# Patient Record
Sex: FEMALE | Race: BLACK OR AFRICAN AMERICAN | NOT HISPANIC OR LATINO | URBAN - METROPOLITAN AREA
[De-identification: names, ages, dates, MRNs, and addresses within clinical notes are randomized per-mention and may not be internally consistent; named-entity substitution may affect disease eponyms.]

---

## 2019-11-04 ENCOUNTER — EMERGENCY (EMERGENCY)
Facility: HOSPITAL | Age: 19
LOS: 1 days | Discharge: HOME | End: 2019-11-04
Attending: EMERGENCY MEDICINE | Admitting: EMERGENCY MEDICINE
Payer: OTHER MISCELLANEOUS

## 2019-11-04 PROCEDURE — 99282 EMERGENCY DEPT VISIT SF MDM: CPT

## 2019-11-04 PROCEDURE — 99053 MED SERV 10PM-8AM 24 HR FAC: CPT
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
## 2019-11-05 VITALS
SYSTOLIC BLOOD PRESSURE: 126 MMHG | TEMPERATURE: 99 F | OXYGEN SATURATION: 100 % | RESPIRATION RATE: 14 BRPM | DIASTOLIC BLOOD PRESSURE: 92 MMHG | HEART RATE: 87 BPM

## 2019-11-05 RX ORDER — IBUPROFEN 200 MG
600 TABLET ORAL ONCE
Refills: 0 | Status: COMPLETED | OUTPATIENT
Start: 2019-11-05 | End: 2019-11-05

## 2019-11-05 RX ADMIN — Medication 600 MILLIGRAM(S): at 01:33
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
## 2019-11-05 NOTE — ED PROVIDER NOTE - NSFOLLOWUPINSTRUCTIONS_ED_ALL_ED_FT
Drink plenty of fluids.  You can take ibuprofen 600mg every 6 hours or Tylenol 650mg every 4 hours as needed for pain or fever.  Apply warm compresses as needed.  Follow-up with your PMD in 1 week.  Return to the emergency department for any new or worsening symptoms.

## 2019-11-05 NOTE — ED PROVIDER NOTE - ENMT, MLM
Airway patent, Nasal mucosa clear. Mouth with normal mucosa. Throat has no vesicles, no oropharyngeal exudates and uvula is midline. No hemotympanum.  No facial bruising or deformity.  Orbital rim intact no tenderness EOMI without pain or proptosis.  No malocclusion or loose teeth.

## 2019-11-05 NOTE — ED PROVIDER NOTE - MUSCULOSKELETAL, MLM
Spine appears normal, range of motion is not limited, no muscle or joint tenderness, no midline spinal tenderness or step off.
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
## 2019-11-05 NOTE — ED PROVIDER NOTE - PATIENT PORTAL LINK FT
You can access the FollowMyHealth Patient Portal offered by [facility] by registering at the following website: [email or web address]. By joining [employer]’s FollowMyHealth portal, you will also be able to view your health information using other applications (apps) compatible with our system.

## 2020-04-26 ENCOUNTER — MESSAGE (OUTPATIENT)
Age: 20
End: 2020-04-26

## 2022-10-11 PROBLEM — Z00.00 ENCOUNTER FOR PREVENTIVE HEALTH EXAMINATION: Status: ACTIVE | Noted: 2022-10-11

## 2024-01-08 ENCOUNTER — APPOINTMENT (OUTPATIENT)
Facility: HOSPITAL | Age: 24
End: 2024-01-08
Payer: COMMERCIAL

## 2024-01-08 ENCOUNTER — HOSPITAL ENCOUNTER (EMERGENCY)
Facility: HOSPITAL | Age: 24
Discharge: HOME | End: 2024-01-08
Attending: STUDENT IN AN ORGANIZED HEALTH CARE EDUCATION/TRAINING PROGRAM
Payer: COMMERCIAL

## 2024-01-08 VITALS
BODY MASS INDEX: 22.2 KG/M2 | SYSTOLIC BLOOD PRESSURE: 126 MMHG | DIASTOLIC BLOOD PRESSURE: 79 MMHG | HEART RATE: 97 BPM | WEIGHT: 130 LBS | RESPIRATION RATE: 17 BRPM | TEMPERATURE: 98.3 F | OXYGEN SATURATION: 100 % | HEIGHT: 64 IN

## 2024-01-08 DIAGNOSIS — O20.0 THREATENED MISCARRIAGE: Primary | ICD-10-CM

## 2024-01-08 LAB
ABO + RH BLD: NORMAL
HCG SERPL-ACNC: ABNORMAL MIU/ML (ref 0–6)

## 2024-01-08 PROCEDURE — 84702 CHORIONIC GONADOTROPIN TEST: CPT

## 2024-01-08 PROCEDURE — 86900 BLOOD TYPING SEROLOGIC ABO: CPT

## 2024-01-08 PROCEDURE — 76801 OB US < 14 WKS SINGLE FETUS: CPT

## 2024-01-08 PROCEDURE — 36415 COLL VENOUS BLD VENIPUNCTURE: CPT

## 2024-01-08 PROCEDURE — 99284 EMERGENCY DEPT VISIT MOD MDM: CPT

## 2024-01-08 PROCEDURE — 86901 BLOOD TYPING SEROLOGIC RH(D): CPT

## 2024-01-08 PROCEDURE — 76817 TRANSVAGINAL US OBSTETRIC: CPT

## 2024-01-08 ASSESSMENT — LIFESTYLE VARIABLES
HOW MANY STANDARD DRINKS CONTAINING ALCOHOL DO YOU HAVE ON A TYPICAL DAY: 1 OR 2
HOW OFTEN DO YOU HAVE A DRINK CONTAINING ALCOHOL: MONTHLY OR LESS

## 2024-01-08 NOTE — ED PROVIDER NOTES
EMERGENCY DEPARTMENT HISTORY AND PHYSICAL EXAM      Date: 2024  Patient Name: Luz Omalley  MRN: [id number]  YOB: 2000  Date of evaluation: 2024  Provider: Gabriel Bender MD     History of Present Illness     Chief Complaint   Patient presents with    Vaginal Bleeding    Pregnancy Problem    Abdominal Cramping       History Provided By: Patient    HPI: Luz Omalley, 23 y.o. female with past medical history as listed and reviewed below presenting to the ED for evaluation of vaginal bleeding with abdominal cramping, G3, .  The patient states she is approximately 6 weeks 3 days pregnant, she found out she was pregnant in urgent care visit for an STI check last week.  She notes today she was sitting on the floor watching TV when she developed abdominal cramping and vaginal bleeding.  She reports her cramping has improved, she has not not yet had an ultrasound to confirm location of pregnancy.  She denies any fever or chills    Medical History     Past Medical History:  History reviewed. No pertinent past medical history.    Past Surgical History:  History reviewed. No pertinent surgical history.    Family History:  History reviewed. No pertinent family history.    Social History:  Social History     Tobacco Use    Smoking status: Never    Smokeless tobacco: Never   Substance Use Topics    Alcohol use: Not Currently    Drug use: Never       Allergies:  No Known Allergies    PCP: No primary care provider on file.    Current Medications:   There are no discharge medications for this patient.      Review of Systems     Positives and Pertinent negatives as per HPI.    Physical Exam     Vitals:  I reviewed the patient's vital signs  Vitals:    24 0016 24 0025   BP: 126/79    Pulse: 97    Resp: 17    Temp: 98.3 °F (36.8 °C)    TempSrc: Oral    SpO2: 100%    Weight:  59 kg (130 lb)   Height:  1.626 m (5' 4\")       Physical Exam  Vitals and nursing note reviewed.   HENT:      Nose: Nose normal.

## 2024-01-08 NOTE — ED TRIAGE NOTES
Patient presents with vaginal bleeding and abc cramping. Approx 6 weeks pregnant. Last cycle began 11/24/2023. States bright red bleeding, saturating pad.

## 2024-01-08 NOTE — DISCHARGE INSTRUCTIONS
Thank you!    Thank you for allowing me to care for you in the emergency department.  I sincerely hope that you are satisfied with your visit today.  It is my goal to provide you with excellent care.    Below you will find a list of your labs and imaging from your visit today if applicable. Should you have any questions regarding these results please do not hesitate to call the emergency department. Please review [employer] for a more detailed result list since the below list may not be comprehensive. Instructions on how to sign up to [employer] should be provided in this packet.    Labs -     Recent Results (from the past 12 hour(s))   HCG, Quantitative, Pregnancy    Collection Time: 24 12:30 AM   Result Value Ref Range    hCG Quant 17,233 (H) 0 - 6 mIU/mL   ABO/RH    Collection Time: 24 12:30 AM   Result Value Ref Range    ABO/Rh O Positive        Radiologic Studies -   US OB TRANSVAGINAL   Final Result      1.  Intrauterine products of conception are present with a gestational sac and   fetal pole. No heart rate is identified this time. There is heterogeneous   echodensity within the endometrial canal suggestive of hemorrhage. Findings may   represent an incomplete . Recommend obstetric follow-up.            US OB LESS THAN 14 WEEKS SINGLE OR FIRST GESTATION    (Results Pending)     [unfilled]  [unfilled]       If you feel that you have not received excellent quality care or timely care, please ask to speak to the nurse manager. Please choose us in the future for your continued health care needs.   ------------------------------------------------------------------------------------------------------------  The exam and treatment you received in the Emergency Department were for an urgent problem and are not intended as complete care. It is very important that you follow-up with a doctor, nurse practitioner, or physician assistant in a timely manner to:  (1) confirm your diagnosis and review all imaging

## 2024-02-09 ENCOUNTER — OFFICE VISIT (OUTPATIENT)
Age: 24
End: 2024-02-09
Payer: COMMERCIAL

## 2024-02-09 VITALS
BODY MASS INDEX: 22.76 KG/M2 | OXYGEN SATURATION: 99 % | DIASTOLIC BLOOD PRESSURE: 63 MMHG | RESPIRATION RATE: 16 BRPM | WEIGHT: 133.31 LBS | HEART RATE: 110 BPM | SYSTOLIC BLOOD PRESSURE: 110 MMHG | HEIGHT: 64 IN | TEMPERATURE: 97.6 F

## 2024-02-09 DIAGNOSIS — Z12.4 SCREENING FOR MALIGNANT NEOPLASM OF CERVIX: ICD-10-CM

## 2024-02-09 DIAGNOSIS — Z87.42 HISTORY OF TERMINATION OF PREGNANCY: ICD-10-CM

## 2024-02-09 DIAGNOSIS — Z01.419 ENCOUNTER FOR ANNUAL ROUTINE GYNECOLOGICAL EXAMINATION: Primary | ICD-10-CM

## 2024-02-09 PROCEDURE — 99385 PREV VISIT NEW AGE 18-39: CPT | Performed by: OBSTETRICS & GYNECOLOGY

## 2024-02-09 NOTE — PROGRESS NOTES
Screening for malignant neoplasm of cervix    - PAP IG, CT-NG-TV, rfx Aptima HPV ASCUS (199325) ([facility])    3. History of termination of pregnancy  Serial hCG levels until negative results, patient oriented.  - HCG, Quantitative, Pregnancy; Standing       Return in about 1 year (around 2/9/2025) for Annual exam.     Armando Wolfe M.D.  [facility]  Obstetrics and Gynecology  [phone]

## 2024-02-14 ENCOUNTER — TELEPHONE (OUTPATIENT)
Age: 24
End: 2024-02-14

## 2024-02-14 LAB — HCG INTACT+B SERPL-ACNC: NORMAL MIU/ML

## 2024-02-14 NOTE — TELEPHONE ENCOUNTER
Tried to contact Patient, line only rings, no voicemail. Sounds like someone answers but does not speak, will try later.

## 2024-02-15 LAB
., LABCORP: NORMAL
C TRACH RRNA CVX QL NAA+PROBE: NEGATIVE
CYTOLOGIST CVX/VAG CYTO: NORMAL
CYTOLOGY CVX/VAG DOC CYTO: NORMAL
CYTOLOGY CVX/VAG DOC THIN PREP: NORMAL
DX ICD CODE: NORMAL
Lab: NORMAL
N GONORRHOEA RRNA CVX QL NAA+PROBE: NEGATIVE
OTHER STN SPEC: NORMAL
STAT OF ADQ CVX/VAG CYTO-IMP: NORMAL
T VAGINALIS RRNA SPEC QL NAA+PROBE: NEGATIVE

## 2024-02-16 ENCOUNTER — OFFICE VISIT (OUTPATIENT)
Age: 24
End: 2024-02-16

## 2024-02-16 VITALS
HEART RATE: 84 BPM | OXYGEN SATURATION: 98 % | BODY MASS INDEX: 22.12 KG/M2 | DIASTOLIC BLOOD PRESSURE: 82 MMHG | WEIGHT: 129.56 LBS | TEMPERATURE: 97.8 F | SYSTOLIC BLOOD PRESSURE: 122 MMHG | HEIGHT: 64 IN | RESPIRATION RATE: 16 BRPM

## 2024-02-16 DIAGNOSIS — Z32.00 ENCOUNTER FOR CONFIRMATION OF PREGNANCY TEST RESULT WITH PHYSICAL EXAMINATION: Primary | ICD-10-CM

## 2024-02-16 DIAGNOSIS — O20.0 THREATENED MISCARRIAGE: ICD-10-CM

## 2024-02-16 PROBLEM — Z87.42 HISTORY OF TERMINATION OF PREGNANCY: Status: RESOLVED | Noted: 2024-02-09 | Resolved: 2024-02-16

## 2024-02-16 NOTE — PROGRESS NOTES
Luz is a 23 y.o. female who presents today for the following:    Chief Complaint   Patient presents with    Follow-up    Ultrasound     Elevated HCG levels        No Known Allergies     No current outpatient medications on file.     History reviewed. No pertinent past medical history.     History reviewed. No pertinent surgical history.     History reviewed. No pertinent family history.     Social History     Socioeconomic History    Marital status: Single     Spouse name: None    Number of children: None    Years of education: None    Highest education level: None   Tobacco Use    Smoking status: Never    Smokeless tobacco: Never   Substance and Sexual Activity    Alcohol use: Not Currently    Drug use: Never    Sexual activity: Yes        HPI   Here today for pregnancy confirmation after findings of elevated hCG levels.  Patient reports being bleeding on and off for the past several weeks.  Denies bleeding today.  LMP: 11/24/2023.  CHRISTIAN: 8/30/2024.    Review of Systems     REVIEW OF SYSTEMS:  Constitutional: Negative  HEENT: Negative  Eyes: Negative  Respiratory: Negative  Cardiovascular: Negative  Gastrointestinal: Negative  Genitourinary: Positive for pregnancy  Musculoskeletal: Negative  Skin: Negative  Neurological: Negative  Endo/heme: Negative  Psychiatric/behavioral: Negative    Vitals:    02/16/24 0824   BP: 122/82   Pulse: 84   Resp: 16   Temp: 97.8 °F (36.6 °C)   SpO2: 98%      OBGyn Exam   Constitutional  .  Appearance: Well-nourished, well-developed, alert, in no acute distress    HEENT  .  Head and face: Appears normal    Neck  .  Inspection/palpation: Normal appearance, no masses or tenderness  .  Lymph nodes: No lymphadenopathy present  .  Thyroid: Gland size normal, nontender, no nodules or masses present on palpation    Chest  .  Respiratory effort: Even and unlabored  .  Auscultation: Normal breath sounds    Cardiovascular  .  Heart:   .  Auscultation: Regular rate and rhythm without

## 2024-02-18 LAB
BASOPHILS # BLD AUTO: 0 X10E3/UL (ref 0–0.2)
BASOPHILS NFR BLD AUTO: 0 %
EOSINOPHIL # BLD AUTO: 0 X10E3/UL (ref 0–0.4)
EOSINOPHIL NFR BLD AUTO: 0 %
ERYTHROCYTE [DISTWIDTH] IN BLOOD BY AUTOMATED COUNT: 12 % (ref 11.7–15.4)
HBV SURFACE AG SERPL QL IA: NEGATIVE
HCT VFR BLD AUTO: 38.7 % (ref 34–46.6)
HCV IGG SERPL QL IA: NON REACTIVE
HGB BLD-MCNC: 13.2 G/DL (ref 11.1–15.9)
HIV 1+2 AB+HIV1 P24 AG SERPL QL IA: NON REACTIVE
IMM GRANULOCYTES # BLD AUTO: 0 X10E3/UL (ref 0–0.1)
IMM GRANULOCYTES NFR BLD AUTO: 0 %
LYMPHOCYTES # BLD AUTO: 1.6 X10E3/UL (ref 0.7–3.1)
LYMPHOCYTES NFR BLD AUTO: 20 %
MCH RBC QN AUTO: 31.8 PG (ref 26.6–33)
MCHC RBC AUTO-ENTMCNC: 34.1 G/DL (ref 31.5–35.7)
MCV RBC AUTO: 93 FL (ref 79–97)
MONOCYTES # BLD AUTO: 0.5 X10E3/UL (ref 0.1–0.9)
MONOCYTES NFR BLD AUTO: 6 %
NEUTROPHILS # BLD AUTO: 6.1 X10E3/UL (ref 1.4–7)
NEUTROPHILS NFR BLD AUTO: 74 %
PLATELET # BLD AUTO: 157 X10E3/UL (ref 150–450)
RBC # BLD AUTO: 4.15 X10E6/UL (ref 3.77–5.28)
RUBV IGG SERPL IA-ACNC: 18.4 INDEX
VZV IGG SER IA-ACNC: 677 INDEX
WBC # BLD AUTO: 8.2 X10E3/UL (ref 3.4–10.8)

## 2024-02-19 LAB — TREPONEMA PALLIDUM IGG+IGM AB [PRESENCE] IN SERUM OR PLASMA BY IMMUNOASSAY: NON REACTIVE

## 2024-02-20 LAB
ABO GROUP BLD: NORMAL
BACTERIA UR CULT: NORMAL
BLD GP AB SCN SERPL QL: NEGATIVE
RH BLD: POSITIVE

## 2024-02-22 LAB
CITATION REF LAB TEST: NORMAL
CLINICAL INFO: NORMAL
ETHNIC BACKGROUND STATED: NORMAL
GENE DIS ANL CARRIER INTERP-IMP: NORMAL
GENE MUT TESTED BLD/T: NORMAL
HGB A MFR BLD ELPH: 97.4 % (ref 96.4–98.8)
HGB A2 MFR BLD ELPH: 2.6 % (ref 1.8–3.2)
HGB F MFR BLD ELPH: 0 % (ref 0–2)
HGB FRACT BLD-IMP: NORMAL
HGB S MFR BLD ELPH: 0 %
INDICATION: NORMAL
LAB DIRECTOR NAME PROVIDER: NORMAL
RECOMMENDATION PATIENT DOC-IMP: NORMAL
REF LAB TEST METHOD: NORMAL
SERVICE CMNT-IMP: NORMAL
SMN1 GENE MUT ANL BLD/T: NORMAL
SPECIMEN SOURCE: NORMAL

## 2024-02-23 ENCOUNTER — HOSPITAL ENCOUNTER (EMERGENCY)
Facility: HOSPITAL | Age: 24
End: 2024-02-23
Payer: COMMERCIAL

## 2024-02-23 ENCOUNTER — HOSPITAL ENCOUNTER (EMERGENCY)
Facility: HOSPITAL | Age: 24
Discharge: HOME | End: 2024-02-23
Attending: FAMILY MEDICINE
Payer: COMMERCIAL

## 2024-02-23 ENCOUNTER — TELEPHONE (OUTPATIENT)
Age: 24
End: 2024-02-23

## 2024-02-23 VITALS
TEMPERATURE: 99 F | OXYGEN SATURATION: 100 % | WEIGHT: 130 LBS | SYSTOLIC BLOOD PRESSURE: 114 MMHG | HEART RATE: 105 BPM | BODY MASS INDEX: 22.2 KG/M2 | RESPIRATION RATE: 16 BRPM | DIASTOLIC BLOOD PRESSURE: 74 MMHG | HEIGHT: 64 IN

## 2024-02-23 DIAGNOSIS — O20.0 THREATENED MISCARRIAGE: Primary | ICD-10-CM

## 2024-02-23 DIAGNOSIS — N93.9 VAGINAL BLEEDING: ICD-10-CM

## 2024-02-23 LAB
ABO + RH BLD: NORMAL
BASOPHILS # BLD: 0 K/UL (ref 0–0.1)
BASOPHILS NFR BLD: 0 % (ref 0–1)
BLOOD GROUP ANTIBODIES SERPL: NEGATIVE
DIFFERENTIAL METHOD BLD: ABNORMAL
EOSINOPHIL # BLD: 0 K/UL (ref 0–0.4)
EOSINOPHIL NFR BLD: 0 % (ref 0–7)
ERYTHROCYTE [DISTWIDTH] IN BLOOD BY AUTOMATED COUNT: 12.1 % (ref 11.5–14.5)
HCG SERPL-ACNC: ABNORMAL MIU/ML (ref 0–6)
HCT VFR BLD AUTO: 36.7 % (ref 35–47)
HGB BLD-MCNC: 12.7 G/DL (ref 11.5–16)
IMM GRANULOCYTES # BLD AUTO: 0 K/UL (ref 0–0.04)
IMM GRANULOCYTES NFR BLD AUTO: 0 % (ref 0–0.5)
LYMPHOCYTES # BLD: 2 K/UL (ref 0.8–3.5)
LYMPHOCYTES NFR BLD: 16 % (ref 12–49)
MCH RBC QN AUTO: 32.3 PG (ref 26–34)
MCHC RBC AUTO-ENTMCNC: 34.6 G/DL (ref 30–36.5)
MCV RBC AUTO: 93.4 FL (ref 80–99)
MONOCYTES # BLD: 0.7 K/UL (ref 0–1)
MONOCYTES NFR BLD: 5 % (ref 5–13)
NEUTS SEG # BLD: 9.9 K/UL (ref 1.8–8)
NEUTS SEG NFR BLD: 79 % (ref 32–75)
PLATELET # BLD AUTO: 219 K/UL (ref 150–400)
PMV BLD AUTO: 10.6 FL (ref 8.9–12.9)
RBC # BLD AUTO: 3.93 M/UL (ref 3.8–5.2)
SPECIMEN EXP DATE BLD: NORMAL
WBC # BLD AUTO: 12.6 K/UL (ref 3.6–11)

## 2024-02-23 PROCEDURE — 76817 TRANSVAGINAL US OBSTETRIC: CPT

## 2024-02-23 PROCEDURE — 86900 BLOOD TYPING SEROLOGIC ABO: CPT

## 2024-02-23 PROCEDURE — 86901 BLOOD TYPING SEROLOGIC RH(D): CPT

## 2024-02-23 PROCEDURE — 36415 COLL VENOUS BLD VENIPUNCTURE: CPT

## 2024-02-23 PROCEDURE — 84702 CHORIONIC GONADOTROPIN TEST: CPT

## 2024-02-23 PROCEDURE — 86850 RBC ANTIBODY SCREEN: CPT

## 2024-02-23 PROCEDURE — 99284 EMERGENCY DEPT VISIT MOD MDM: CPT

## 2024-02-23 PROCEDURE — 85025 COMPLETE CBC W/AUTO DIFF WBC: CPT

## 2024-02-23 ASSESSMENT — PAIN - FUNCTIONAL ASSESSMENT: PAIN_FUNCTIONAL_ASSESSMENT: 0-10

## 2024-02-23 ASSESSMENT — PAIN SCALES - GENERAL: PAINLEVEL_OUTOF10: 3

## 2024-02-23 NOTE — DISCHARGE INSTRUCTIONS
Thank you!  Thank you for allowing me to care for you in the emergency department. It is my goal to provide you with excellent care.  Please fill out the survey that will come to you by mail or email since we listen to your feedback!     Below you will find a list of your tests from today's visit.  Should you have any questions, please do not hesitate to call the emergency department.    Labs  Recent Results (from the past 12 hour(s))   CBC with Auto Differential    Collection Time: 02/23/24  4:02 PM   Result Value Ref Range    WBC 12.6 (H) 3.6 - 11.0 K/uL    RBC 3.93 3.80 - 5.20 M/uL    Hemoglobin 12.7 11.5 - 16.0 g/dL    Hematocrit 36.7 35.0 - 47.0 %    MCV 93.4 80.0 - 99.0 FL    MCH 32.3 26.0 - 34.0 PG    MCHC 34.6 30.0 - 36.5 g/dL    RDW 12.1 11.5 - 14.5 %    Platelets 219 150 - 400 K/uL    MPV 10.6 8.9 - 12.9 FL    Neutrophils % 79 (H) 32 - 75 %    Lymphocytes % 16 12 - 49 %    Monocytes % 5 5 - 13 %    Eosinophils % 0 0 - 7 %    Basophils % 0 0 - 1 %    Immature Granulocytes 0 0.0 - 0.5 %    Neutrophils Absolute 9.9 (H) 1.8 - 8.0 K/UL    Lymphocytes Absolute 2.0 0.8 - 3.5 K/UL    Monocytes Absolute 0.7 0.0 - 1.0 K/UL    Eosinophils Absolute 0.0 0.0 - 0.4 K/UL    Basophils Absolute 0.0 0.0 - 0.1 K/UL    Absolute Immature Granulocyte 0.0 0.00 - 0.04 K/UL    Differential Type AUTOMATED     HCG, Quantitative, Pregnancy    Collection Time: 02/23/24  4:02 PM   Result Value Ref Range    hCG Quant 64,723 (H) 0 - 6 mIU/mL       Radiologic Studies  US OB TRANSVAGINAL   Final Result   Single live 13-week 1 day intrauterine gestation. There is a small   amount of fluid and clot within the cervix.                    ------------------------------------------------------------------------------------------------------------  The exam and treatment you received in the Emergency Department were for an urgent problem and are not intended as complete care. It is important that you follow-up with a doctor, nurse  practitioner, or physician assistant to:  (1) confirm your diagnosis,  (2) re-evaluation of changes in your illness and treatment, and (3) for ongoing care. Please take your discharge instructions with you when you go to your follow-up appointment.     If you have any problem arranging a follow-up appointment, contact the Emergency Department.  If your symptoms become worse or you do not improve as expected and you are unable to reach your health care provider, please return to the Emergency Department. We are available 24 hours a day.     If a prescription has been provided, please have it filled as soon as possible to prevent a delay in treatment. If you have any questions or reservations about taking the medication due to side effects or interactions with other medications, please call your primary care provider or contact the ER.

## 2024-02-23 NOTE — TELEPHONE ENCOUNTER
Patient called stating she has started bleeding and cramping and wants to know what she needs to do.  States she was seen by Dr Mendez last on 02/16/24 diagnosed with a threatened miscarriage.  Advised the patient to go to the ER for evaluation as Dr Mendez is not in the office today.

## 2024-02-23 NOTE — TELEPHONE ENCOUNTER
Returned the patient's call and advised her she will be given an note by the ER after she is seen.  Also made her aware if her employer requires documentation from our office that would be based off of her ER visit.

## 2024-02-23 NOTE — ED TRIAGE NOTES
Seen here in jan for the same, 2 weeks ago she had an ultrasound that said her uterus was 1/2 filled with blood, cramping and abd pain. Vag bleeding started this am 0100

## 2024-02-25 NOTE — ED PROVIDER NOTES
EMERGENCY DEPARTMENT HISTORY AND PHYSICAL EXAM      Date: 2/23/2024  Patient Name: Luz Omalley    History of Presenting Illness     Chief Complaint   Patient presents with    Vaginal Bleeding       History Provided By:     HPI: Luz Omalley, is a very pleasant 23 y.o. female presenting to the ED with a chief complaint of event of bleeding.  States she is approximately 3 months pregnant.  She has been having intermittent bleeding throughout this pregnancy.  States she was previously seen at outside facility and told her uterus contained blood.  Since this time she continues to have intermittent vaginal bleeding.  Occasional pelvic cramping but denies abdominal pain.  No fevers chills rigors.  No flank pain or urinary symptoms.  No lightheadedness nor syncope.    Denies any other symptoms at this time.    PCP: No primary care provider on file.    No current facility-administered medications on file prior to encounter.     No current outpatient medications on file prior to encounter.       Past History     Past Medical History:  No past medical history on file.    Past Surgical History:  No past surgical history on file.    Family History:  No family history on file.    Social History:  Social History     Tobacco Use    Smoking status: Never    Smokeless tobacco: Never   Substance Use Topics    Alcohol use: Not Currently    Drug use: Never       Allergies:  No Known Allergies      Review of Systems     Negative unless otherwise stated in HPI    Physical Exam     Physical Exam  Constitutional:       Appearance: Normal appearance.   HENT:      Head: Normocephalic and atraumatic.      Nose: Nose normal.   Eyes:      Extraocular Movements: Extraocular movements intact.      Pupils: Pupils are equal, round, and reactive to light.   Cardiovascular:      Rate and Rhythm: Normal rate and regular rhythm.   Pulmonary:      Effort: Pulmonary effort is normal. No respiratory distress.      Breath sounds: Normal breath sounds.

## 2024-02-27 ENCOUNTER — TELEPHONE (OUTPATIENT)
Age: 24
End: 2024-02-27

## 2024-02-27 NOTE — TELEPHONE ENCOUNTER
Patient was seen in the Hospital on 02/23 and was told she is having bleeding and spotting. Was told to contact the office to see if she is able to be seen sooner

## 2024-02-27 NOTE — TELEPHONE ENCOUNTER
Returned the patient's call after review of her results by Dr Russell and advised her per Dr Russell to keep her appointment with Dr Mendez on 03/12/24.  She states she had no bleeding since Sunday (none yesterday or today).  Advised her no intercourse until after her appointment with Dr Mendez on 03/12/24.  She was also made aware to contact the office is her bleeding returns.

## 2024-03-25 LAB
CFTR MUT ANL BLD/T: NORMAL
CITATION REF LAB TEST: NORMAL
CLINICAL INFO: NORMAL
ETHNIC BACKGROUND STATED: NORMAL
GENE DIS ANL CARRIER INTERP-IMP: NORMAL
GENE MUT TESTED BLD/T: NORMAL
INDICATION: NORMAL
LAB DIRECTOR NAME PROVIDER: NORMAL
RECOMMENDATION PATIENT DOC-IMP: NORMAL
REF LAB TEST METHOD: NORMAL
SERVICE CMNT-IMP: NORMAL
SPECIMEN SOURCE: NORMAL

## 2024-06-13 ENCOUNTER — HOSPITAL ENCOUNTER (EMERGENCY)
Facility: HOSPITAL | Age: 24
Discharge: HOME | End: 2024-06-13
Payer: COMMERCIAL

## 2024-06-13 VITALS
HEART RATE: 62 BPM | SYSTOLIC BLOOD PRESSURE: 110 MMHG | RESPIRATION RATE: 16 BRPM | WEIGHT: 124 LBS | BODY MASS INDEX: 21.17 KG/M2 | OXYGEN SATURATION: 100 % | HEIGHT: 64 IN | TEMPERATURE: 98.6 F | DIASTOLIC BLOOD PRESSURE: 75 MMHG

## 2024-06-13 DIAGNOSIS — N75.0 BARTHOLIN CYST: Primary | ICD-10-CM

## 2024-06-13 LAB
APPEARANCE UR: CLEAR
BACTERIA URNS QL MICRO: NEGATIVE /HPF
BILIRUB UR QL: NEGATIVE
CLUE CELLS VAG QL WET PREP: NORMAL
COLOR UR: YELLOW
EPITH CASTS URNS QL MICRO: ABNORMAL /LPF
GLUCOSE UR STRIP.AUTO-MCNC: NEGATIVE MG/DL
HGB UR QL STRIP: NEGATIVE
KETONES UR QL STRIP.AUTO: NEGATIVE MG/DL
LEUKOCYTE ESTERASE UR QL STRIP.AUTO: NEGATIVE
NITRITE UR QL STRIP.AUTO: NEGATIVE
PH UR STRIP: 6 (ref 5–8)
PROT UR STRIP-MCNC: NEGATIVE MG/DL
RBC #/AREA URNS HPF: ABNORMAL /HPF (ref 0–5)
SP GR UR REFRACTOMETRY: 1.02 (ref 1–1.03)
T VAGINALIS VAG QL WET PREP: NORMAL
URINE CULTURE IF INDICATED: ABNORMAL
UROBILINOGEN UR QL STRIP.AUTO: 0.1 EU/DL (ref 0.2–1)
WBC URNS QL MICRO: ABNORMAL /HPF (ref 0–4)
YEAST: NORMAL

## 2024-06-13 PROCEDURE — 99283 EMERGENCY DEPT VISIT LOW MDM: CPT

## 2024-06-13 PROCEDURE — 81001 URINALYSIS AUTO W/SCOPE: CPT

## 2024-06-13 PROCEDURE — 87491 CHLMYD TRACH DNA AMP PROBE: CPT

## 2024-06-13 PROCEDURE — 87210 SMEAR WET MOUNT SALINE/INK: CPT

## 2024-06-13 PROCEDURE — 87591 N.GONORRHOEAE DNA AMP PROB: CPT

## 2024-06-13 RX ORDER — IBUPROFEN 600 MG/1
600 TABLET ORAL EVERY 6 HOURS PRN
Qty: 20 TABLET | Refills: 0 | Status: SHIPPED | OUTPATIENT
Start: 2024-06-13

## 2024-06-13 RX ORDER — HYDROCODONE BITARTRATE AND ACETAMINOPHEN 5; 325 MG/1; MG/1
1 TABLET ORAL EVERY 4 HOURS PRN
Qty: 12 TABLET | Refills: 0 | Status: SHIPPED | OUTPATIENT
Start: 2024-06-13 | End: 2024-06-16

## 2024-06-13 ASSESSMENT — PAIN - FUNCTIONAL ASSESSMENT: PAIN_FUNCTIONAL_ASSESSMENT: 0-10

## 2024-06-13 ASSESSMENT — PAIN SCALES - GENERAL: PAINLEVEL_OUTOF10: 4

## 2024-06-13 ASSESSMENT — LIFESTYLE VARIABLES
HOW OFTEN DO YOU HAVE A DRINK CONTAINING ALCOHOL: MONTHLY OR LESS
HOW MANY STANDARD DRINKS CONTAINING ALCOHOL DO YOU HAVE ON A TYPICAL DAY: 1 OR 2

## 2024-06-14 LAB
C TRACH DNA SPEC QL NAA+PROBE: NEGATIVE
N GONORRHOEA DNA SPEC QL NAA+PROBE: NEGATIVE
SAMPLE TYPE: NORMAL
SERVICE CMNT-IMP: NORMAL
SPECIMEN SOURCE: NORMAL

## 2024-06-14 NOTE — ED PROVIDER NOTES
[facility] EMERGENCY DEPT  EMERGENCY DEPARTMENT HISTORY AND PHYSICAL EXAM      Date: 6/13/2024  Patient Name: Luz Omalley  MRN: [id number]  YOB: 2000  Date of evaluation: 6/13/2024  Provider: Boyd Bone PA-C   Note Started: 8:53 PM EDT 6/13/24    HISTORY OF PRESENT ILLNESS     Chief Complaint   Patient presents with    Abscess       History Provided By: Patient    HPI: Luz Omalley is a 23 y.o. female with no significant past medical history presents this ED with cc of skin problem.    PAST MEDICAL HISTORY   Past Medical History:  History reviewed. No pertinent past medical history.    Past Surgical History:  History reviewed. No pertinent surgical history.    Family History:  History reviewed. No pertinent family history.    Social History:  Social History     Tobacco Use    Smoking status: Never    Smokeless tobacco: Never   Vaping Use    Vaping Use: Never used   Substance Use Topics    Alcohol use: Not Currently    Drug use: Not Currently       Allergies:  No Known Allergies    PCP: No primary care provider on file.    Current Meds:   No current facility-administered medications for this encounter.     No current outpatient medications on file.       Social Determinants of Health:   Social Determinants of Health     Tobacco Use: Low Risk  (6/13/2024)    Patient History     Smoking Tobacco Use: Never     Smokeless Tobacco Use: Never     Passive Exposure: Not on file   Alcohol Use: Not At Risk (6/13/2024)    AUDIT-C     Frequency of Alcohol Consumption: Monthly or less     Average Number of Drinks: 1 or 2     Frequency of Binge Drinking: Never   Financial Resource Strain: Not on file   Food Insecurity: Not on file   Transportation Needs: Not on file   Physical Activity: Not on file   Stress: Not on file   Social Connections: Not on file   Intimate Partner Violence: Not on file   Depression: Not on file   Housing Stability: Not on file   Interpersonal Safety: Not At Risk (6/13/2024)    Interpersonal Safety

## 2024-06-16 ENCOUNTER — HOSPITAL ENCOUNTER (EMERGENCY)
Facility: HOSPITAL | Age: 24
Discharge: HOME | End: 2024-06-16
Attending: FAMILY MEDICINE
Payer: COMMERCIAL

## 2024-06-16 VITALS
DIASTOLIC BLOOD PRESSURE: 66 MMHG | SYSTOLIC BLOOD PRESSURE: 109 MMHG | BODY MASS INDEX: 21.17 KG/M2 | RESPIRATION RATE: 16 BRPM | HEIGHT: 64 IN | OXYGEN SATURATION: 100 % | WEIGHT: 124 LBS | HEART RATE: 82 BPM | TEMPERATURE: 98.6 F

## 2024-06-16 DIAGNOSIS — N75.0 BARTHOLIN GLAND CYST: Primary | ICD-10-CM

## 2024-06-16 PROCEDURE — 99282 EMERGENCY DEPT VISIT SF MDM: CPT

## 2024-06-16 ASSESSMENT — PAIN DESCRIPTION - PAIN TYPE: TYPE: ACUTE PAIN

## 2024-06-16 ASSESSMENT — PAIN SCALES - GENERAL: PAINLEVEL_OUTOF10: 6

## 2024-06-16 ASSESSMENT — PAIN DESCRIPTION - LOCATION: LOCATION: PERINEUM

## 2024-06-16 ASSESSMENT — LIFESTYLE VARIABLES
HOW OFTEN DO YOU HAVE A DRINK CONTAINING ALCOHOL: NEVER
HOW MANY STANDARD DRINKS CONTAINING ALCOHOL DO YOU HAVE ON A TYPICAL DAY: PATIENT DOES NOT DRINK

## 2024-06-16 ASSESSMENT — PAIN - FUNCTIONAL ASSESSMENT: PAIN_FUNCTIONAL_ASSESSMENT: 0-10

## 2024-06-16 ASSESSMENT — PAIN DESCRIPTION - ORIENTATION: ORIENTATION: LEFT;RIGHT

## 2024-06-16 NOTE — ED TRIAGE NOTES
Patient returns from Thursday with a cyst on other side of vagina. Also states that original cyst has gotten worse but has been following orders such as sitz bath.

## 2024-06-19 NOTE — ED PROVIDER NOTES
EMERGENCY DEPARTMENT HISTORY AND PHYSICAL EXAM      Date: 6/16/2024  Patient Name: Luz Omalley    History of Presenting Illness     Chief Complaint   Patient presents with    Cyst       History Provided By:     HPI: Luz Omalley, is a very pleasant 23 y.o. female presenting to the ED with a chief complaint of cyst.  States she was recently seen for suspected both gland cyst.  States her symptoms are not getting better.  No drainage.  No fevers.  In process of making follow-up appointment with OB/GYN.    Denies any other symptoms at this time.    PCP: No primary care provider on file.    No current facility-administered medications on file prior to encounter.     Current Outpatient Medications on File Prior to Encounter   Medication Sig Dispense Refill    ibuprofen (ADVIL;MOTRIN) 600 MG tablet Take 1 tablet by mouth every 6 hours as needed for Pain 20 tablet 0       Past History     Past Medical History:  History reviewed. No pertinent past medical history.    Past Surgical History:  History reviewed. No pertinent surgical history.    Family History:  History reviewed. No pertinent family history.    Social History:  Social History     Tobacco Use    Smoking status: Never    Smokeless tobacco: Never   Vaping Use    Vaping Use: Never used   Substance Use Topics    Alcohol use: Not Currently    Drug use: Not Currently       Allergies:  No Known Allergies      Review of Systems     Negative unless otherwise stated in HPI    Physical Exam     Physical Exam  Constitutional:       Appearance: Normal appearance.   HENT:      Head: Normocephalic and atraumatic.      Nose: Nose normal.   Eyes:      Extraocular Movements: Extraocular movements intact.      Pupils: Pupils are equal, round, and reactive to light.   Cardiovascular:      Rate and Rhythm: Normal rate and regular rhythm.   Pulmonary:      Effort: Pulmonary effort is normal. No respiratory distress.      Breath sounds: Normal breath sounds.   Abdominal:

## 2024-08-19 ENCOUNTER — TELEPHONE (OUTPATIENT)
Age: 24
End: 2024-08-19

## 2024-08-23 ENCOUNTER — OFFICE VISIT (OUTPATIENT)
Age: 24
End: 2024-08-23

## 2024-08-23 VITALS
OXYGEN SATURATION: 99 % | SYSTOLIC BLOOD PRESSURE: 110 MMHG | RESPIRATION RATE: 16 BRPM | BODY MASS INDEX: 22.53 KG/M2 | DIASTOLIC BLOOD PRESSURE: 78 MMHG | HEART RATE: 82 BPM | WEIGHT: 132 LBS | HEIGHT: 64 IN | TEMPERATURE: 96.9 F

## 2024-08-23 DIAGNOSIS — N92.6 IRREGULAR MENSTRUAL CYCLE: ICD-10-CM

## 2024-08-23 DIAGNOSIS — Z11.3 SCREENING EXAMINATION FOR STD (SEXUALLY TRANSMITTED DISEASE): Primary | ICD-10-CM

## 2024-08-23 NOTE — PROGRESS NOTES
Auscultation: Normal breath sounds    Cardiovascular  .  Heart:   .  Auscultation: Regular rate and rhythm without murmur    Gastrointestinal  .  Abdominal examination: Abdomen nontender to palpation, normal bowel sounds, no masses present  .  Liver and spleen: No hepatomegaly present, spleen not palpable  .  Hernias: No hernias identified    Genitourinary  .  External genitalia: Normal appearance for age, no discharge present, no tenderness present, no inflammatory lesions present, no masses present, no atrophy present  .  Vagina: Normal vagina without central or paravaginal defects, no discharge present, no inflammatory lesions present, no masses present  .  Bladder: Nontender to palpation  .  Urethra: Appears normal  .  Cervix: Normal  .  Uterus: Normal size, shape and consistency  .  Adnexa: No adnexal tenderness present, no adnexal masses palpated  .  Perineum: Perineum with in normal limits, no evidence of trauma, no rashes or skin lesions present  .  Anus: Within normal limits, no hemorrhoids present  .  Inguinal lymph nodes: No lymphadenopathy present    Skin  .  General inspection: No rash, no lesions identified    Neurologic/psychiatric  .  Mental status:   .  Orientation: Grossly oriented to person, place and time   .  Mood and affect: Normal mood, affect appropriate    Chaperone was present for intimate exam.    No results found for this visit on 08/23/24.    Orders Placed This Encounter   Procedures    Chlamydia, Gonorrhea, Trichomoniasis    Hepatitis C Antibody    Hepatitis B Surface Antigen    HIV 1/2 Ag/Ab, 4TH Generation,W Rflx Confirm    T Pallidum Screen W/Reflex    CBC with Auto Differential     Standing Status:   Future     Standing Expiration Date:   8/23/2025    TSH + Free T4 Panel     Standing Status:   Future     Standing Expiration Date:   8/23/2025        1. Screening examination for STD (sexually transmitted disease)    - Chlamydia, Gonorrhea, Trichomoniasis  - Hepatitis C Antibody  -

## 2024-08-24 LAB
BASOPHILS # BLD AUTO: 0 X10E3/UL (ref 0–0.2)
BASOPHILS NFR BLD AUTO: 1 %
EOSINOPHIL # BLD AUTO: 0.1 X10E3/UL (ref 0–0.4)
EOSINOPHIL NFR BLD AUTO: 1 %
ERYTHROCYTE [DISTWIDTH] IN BLOOD BY AUTOMATED COUNT: 13.5 % (ref 11.7–15.4)
HBV SURFACE AG SERPL QL IA: NEGATIVE
HCT VFR BLD AUTO: 32.6 % (ref 34–46.6)
HCV IGG SERPL QL IA: NON REACTIVE
HGB BLD-MCNC: 10.4 G/DL (ref 11.1–15.9)
HIV 1+2 AB+HIV1 P24 AG SERPL QL IA: NON REACTIVE
IMM GRANULOCYTES # BLD AUTO: 0 X10E3/UL (ref 0–0.1)
IMM GRANULOCYTES NFR BLD AUTO: 0 %
LYMPHOCYTES # BLD AUTO: 2.1 X10E3/UL (ref 0.7–3.1)
LYMPHOCYTES NFR BLD AUTO: 38 %
MCH RBC QN AUTO: 27.3 PG (ref 26.6–33)
MCHC RBC AUTO-ENTMCNC: 31.9 G/DL (ref 31.5–35.7)
MCV RBC AUTO: 86 FL (ref 79–97)
MONOCYTES # BLD AUTO: 0.5 X10E3/UL (ref 0.1–0.9)
MONOCYTES NFR BLD AUTO: 9 %
NEUTROPHILS # BLD AUTO: 2.9 X10E3/UL (ref 1.4–7)
NEUTROPHILS NFR BLD AUTO: 51 %
PLATELET # BLD AUTO: 254 X10E3/UL (ref 150–450)
RBC # BLD AUTO: 3.81 X10E6/UL (ref 3.77–5.28)
T4 FREE SERPL-MCNC: 1.08 NG/DL (ref 0.82–1.77)
TSH SERPL DL<=0.005 MIU/L-ACNC: 1.47 UIU/ML (ref 0.45–4.5)
WBC # BLD AUTO: 5.6 X10E3/UL (ref 3.4–10.8)

## 2024-08-25 LAB — TREPONEMA PALLIDUM IGG+IGM AB [PRESENCE] IN SERUM OR PLASMA BY IMMUNOASSAY: NON REACTIVE

## 2024-08-26 LAB
C TRACH RRNA SPEC QL NAA+PROBE: NEGATIVE
N GONORRHOEA RRNA SPEC QL NAA+PROBE: NEGATIVE
T VAGINALIS RRNA SPEC QL NAA+PROBE: NEGATIVE

## 2024-10-29 ENCOUNTER — OFFICE VISIT (OUTPATIENT)
Age: 24
End: 2024-10-29

## 2024-10-29 VITALS
HEART RATE: 94 BPM | SYSTOLIC BLOOD PRESSURE: 116 MMHG | TEMPERATURE: 98.2 F | BODY MASS INDEX: 22.8 KG/M2 | OXYGEN SATURATION: 99 % | DIASTOLIC BLOOD PRESSURE: 81 MMHG | WEIGHT: 132.8 LBS

## 2024-10-29 DIAGNOSIS — N76.0 ACUTE VAGINITIS: ICD-10-CM

## 2024-10-29 DIAGNOSIS — L02.419 ABSCESS, AXILLA: Primary | ICD-10-CM

## 2024-10-29 RX ORDER — SULFAMETHOXAZOLE AND TRIMETHOPRIM 800; 160 MG/1; MG/1
1 TABLET ORAL 2 TIMES DAILY
Qty: 14 TABLET | Refills: 0 | Status: SHIPPED | OUTPATIENT
Start: 2024-10-29 | End: 2024-11-05

## 2024-10-29 RX ORDER — FLUCONAZOLE 150 MG/1
150 TABLET ORAL
Qty: 2 TABLET | Refills: 0 | Status: SHIPPED | OUTPATIENT
Start: 2024-10-29 | End: 2024-11-04

## 2024-10-29 NOTE — PROGRESS NOTES
Luz Omalley (:  2000) is a 23 y.o. female,New patient, here for evaluation of the following chief complaint(s):  Other (Lump under left arm pit x 5 days , painful no drainage)          ASSESSMENT/PLAN:    Assessment & Plan  Abscess, axilla          Discussed with patient need to have abscess drained we will need to go to the emergency department to do this due to the location of the abscess in the axillary area patient states she is unable to go today will start her on antibiotics discussed doing hot water compresses every few hours to help drawl purulent discharge out more and then to make sure to go to the emergency department tomorrow so she can have the abscess drained    Discussed with patient could do vaginal swab to check for STDs as well as BV or Candida patient states she does not think it is an STD and does not want to do any testing discussed with patient could use fluconazole has most likely a yeast or fungal infection and can use over-the-counter miconazole cream to external area and if symptoms do not improve in the next few days can return to urgent care for further assessment patient agrees with plan       I have discussed the results, diagnosis and treatment plan with the patient. The patient also understands that early in the process of an illness, an urgent care workup can be falsely reassuring. Routine discharge counseling and specific return precautions discussed with patient and the patient understands that worsening, changing or persistent symptoms should prompt an immediate return to the urgent care or emergency department. Patient/Guardian expressed understanding and agrees with the discharge plan. No further questions at time of discharge.     SUBJECTIVE/OBJECTIVE:  23 y.o. female presents with symptoms of Other (Lump under left arm pit x 5 days , painful no drainage)      23-year-old female presents to urgent care stating she has a bump under her left arm patient states she noticed

## 2024-10-29 NOTE — PATIENT INSTRUCTIONS
Discussed with patient need to have abscess drained we will need to go to the emergency department to do this due to the location of the abscess in the axillary area patient states she is unable to go today will start her on antibiotics discussed doing hot water compresses every few hours to help drawl purulent discharge out more and then to make sure to go to the emergency department tomorrow so she can have the abscess drained    Discussed with patient could do vaginal swab to check for STDs as well as BV or Candida patient states she does not think it is an STD and does not want to do any testing discussed with patient could use fluconazole has most likely a yeast or fungal infection and can use over-the-counter miconazole cream to external area and if symptoms do not improve in the next few days can return to urgent care for further assessment patient agrees with plan

## 2024-11-08 ENCOUNTER — OFFICE VISIT (OUTPATIENT)
Age: 24
End: 2024-11-08
Payer: COMMERCIAL

## 2024-11-08 VITALS
HEIGHT: 64 IN | TEMPERATURE: 98.7 F | OXYGEN SATURATION: 95 % | HEART RATE: 91 BPM | SYSTOLIC BLOOD PRESSURE: 108 MMHG | BODY MASS INDEX: 22.36 KG/M2 | WEIGHT: 131 LBS | RESPIRATION RATE: 20 BRPM | DIASTOLIC BLOOD PRESSURE: 69 MMHG

## 2024-11-08 DIAGNOSIS — L02.412 CUTANEOUS ABSCESS OF LEFT AXILLA: ICD-10-CM

## 2024-11-08 DIAGNOSIS — L73.2 HIDRADENITIS AXILLARIS: ICD-10-CM

## 2024-11-08 DIAGNOSIS — Z76.89 ENCOUNTER TO ESTABLISH CARE: Primary | ICD-10-CM

## 2024-11-08 PROCEDURE — 99204 OFFICE O/P NEW MOD 45 MIN: CPT | Performed by: INTERNAL MEDICINE

## 2024-11-08 SDOH — ECONOMIC STABILITY: FOOD INSECURITY: WITHIN THE PAST 12 MONTHS, YOU WORRIED THAT YOUR FOOD WOULD RUN OUT BEFORE YOU GOT MONEY TO BUY MORE.: NEVER TRUE

## 2024-11-08 SDOH — ECONOMIC STABILITY: FOOD INSECURITY: WITHIN THE PAST 12 MONTHS, THE FOOD YOU BOUGHT JUST DIDN'T LAST AND YOU DIDN'T HAVE MONEY TO GET MORE.: NEVER TRUE

## 2024-11-08 SDOH — ECONOMIC STABILITY: INCOME INSECURITY: HOW HARD IS IT FOR YOU TO PAY FOR THE VERY BASICS LIKE FOOD, HOUSING, MEDICAL CARE, AND HEATING?: NOT HARD AT ALL

## 2024-11-08 ASSESSMENT — PATIENT HEALTH QUESTIONNAIRE - PHQ9
SUM OF ALL RESPONSES TO PHQ9 QUESTIONS 1 & 2: 0
1. LITTLE INTEREST OR PLEASURE IN DOING THINGS: NOT AT ALL
SUM OF ALL RESPONSES TO PHQ QUESTIONS 1-9: 0
2. FEELING DOWN, DEPRESSED OR HOPELESS: NOT AT ALL
SUM OF ALL RESPONSES TO PHQ QUESTIONS 1-9: 0

## 2024-11-08 NOTE — PROGRESS NOTES
Chief Complaint   Patient presents with    New Patient    Follow-up     Was seen at [facility]     HPI:  Luz Omalley is a 23 y.o. AA female presents to [facility] as ER follow-up.  Patient was seen at [facility] for axillary abscess. Prior to [facility] visit she seen in some ER for same symptom and started on Bactrim. However she did not take the medication. Because she advised during visit at [facility] not to take medication. Today she says abscess is tender.    Review of Systems  Pertinent items are noted in hpi    History reviewed. No pertinent past medical history.  Past Surgical History:   Procedure Laterality Date    SHOULDER ARTHROSCOPY Right 01/2023     Social History     Socioeconomic History    Marital status: Single     Spouse name: None    Number of children: None    Years of education: None    Highest education level: None   Tobacco Use    Smoking status: Never    Smokeless tobacco: Never   Vaping Use    Vaping status: Never Used   Substance and Sexual Activity    Alcohol use: Not Currently    Drug use: Not Currently     Comment: social    Sexual activity: Yes     Social Determinants of Health     Financial Resource Strain: Low Risk  (11/8/2024)    Overall Financial Resource Strain (CARDIA)     Difficulty of Paying Living Expenses: Not hard at all   Food Insecurity: No Food Insecurity (11/8/2024)    Hunger Vital Sign     Worried About Running Out of Food in the Last Year: Never true     Ran Out of Food in the Last Year: Never true   Transportation Needs: Unknown (11/8/2024)    PRAPARE - Transportation     Lack of Transportation (Non-Medical): No   Housing Stability: Unknown (11/8/2024)    Housing Stability Vital Sign     Homeless in the Last Year: No     Family History   Problem Relation Age of Onset    No Known Problems Mother     No Known Problems Father     No Known Problems Sister      No current outpatient medications on file.     No current facility-administered medications for this visit.     No Known

## 2024-11-11 RX ORDER — SULFAMETHOXAZOLE AND TRIMETHOPRIM 800; 160 MG/1; MG/1
1 TABLET ORAL 2 TIMES DAILY
Qty: 14 TABLET | Refills: 0 | Status: SHIPPED | OUTPATIENT
Start: 2024-11-11 | End: 2024-11-18

## 2024-11-27 ENCOUNTER — TELEMEDICINE (OUTPATIENT)
Age: 24
End: 2024-11-27
Payer: COMMERCIAL

## 2024-11-27 DIAGNOSIS — L73.2 HIDRADENITIS AXILLARIS: Primary | ICD-10-CM

## 2024-11-27 PROCEDURE — 99213 OFFICE O/P EST LOW 20 MIN: CPT | Performed by: INTERNAL MEDICINE

## 2024-11-27 NOTE — PROGRESS NOTES
Luz Omalley, was evaluated through a synchronous (real-time) audio-video encounter. The patient (or guardian if applicable) is aware that this is a billable service, which includes applicable co-pays. This Virtual Visit was conducted with patient's (and/or legal guardian's) consent. Patient identification was verified, and a caregiver was present when appropriate.   The patient was located at Home: [address]  [facility] [id number]  Provider was located at Facility (Appt Dept): [address]  [place] [address]  Confirm you are appropriately licensed, registered, or certified to deliver care in the state where the patient is located as indicated above. If you are not or unsure, please re-schedule the visit: Yes, I confirm.     Luz Omalley (:  2000) is a Established patient, presenting virtually for evaluation of the following:      Below is the assessment and plan developed based on review of pertinent history, physical exam, labs, studies, and medications.     Assessment & Plan  Hidradenitis axillaris   Monitored by specialist- no acute findings meriting change in the plan      Return in about 6 months (around 2025), or if symptoms worsen or fail to improve.       Subjective   Luz Omalley is a 23 y.o. female started on bactrim for axillar abscess.  Report abscess has resolved. She was referred to dermatologist.  Appointment is scheduled for 2025.     Review of Systems   As per hpi    Objective   Patient-Reported Vitals  No data recorded     Physical Exam  [INSTRUCTIONS:  \"[x]\" Indicates a positive item  \"[]\" Indicates a negative item  -- DELETE ALL ITEMS NOT EXAMINED]    Constitutional: [x] Appears well-developed and well-nourished [x] No apparent distress      [] Abnormal -     Mental status: [x] Alert and awake  [x] Oriented to person/place/time [x] Able to follow commands    [] Abnormal -     Eyes:   EOM    [x]  Normal    [] Abnormal -   Sclera  [x]  Normal    []